# Patient Record
Sex: FEMALE | Race: WHITE | NOT HISPANIC OR LATINO | URBAN - METROPOLITAN AREA
[De-identification: names, ages, dates, MRNs, and addresses within clinical notes are randomized per-mention and may not be internally consistent; named-entity substitution may affect disease eponyms.]

---

## 2022-05-03 ENCOUNTER — LAB (OUTPATIENT)
Dept: LAB | Facility: HOSPITAL | Age: 37
End: 2022-05-03

## 2022-05-03 ENCOUNTER — OFFICE VISIT (OUTPATIENT)
Dept: INTERNAL MEDICINE | Facility: CLINIC | Age: 37
End: 2022-05-03

## 2022-05-03 VITALS
OXYGEN SATURATION: 98 % | WEIGHT: 253 LBS | TEMPERATURE: 98.4 F | DIASTOLIC BLOOD PRESSURE: 78 MMHG | BODY MASS INDEX: 44.83 KG/M2 | HEIGHT: 63 IN | HEART RATE: 68 BPM | SYSTOLIC BLOOD PRESSURE: 126 MMHG | RESPIRATION RATE: 16 BRPM

## 2022-05-03 DIAGNOSIS — E66.01 CLASS 3 SEVERE OBESITY WITH SERIOUS COMORBIDITY AND BODY MASS INDEX (BMI) OF 40.0 TO 44.9 IN ADULT, UNSPECIFIED OBESITY TYPE: ICD-10-CM

## 2022-05-03 DIAGNOSIS — Z13.220 LIPID SCREENING: ICD-10-CM

## 2022-05-03 DIAGNOSIS — G56.91 NEUROPATHY OF HAND, RIGHT: ICD-10-CM

## 2022-05-03 DIAGNOSIS — Z13.29 THYROID DISORDER SCREENING: ICD-10-CM

## 2022-05-03 DIAGNOSIS — Z13.0 SCREENING FOR BLOOD DISEASE: ICD-10-CM

## 2022-05-03 DIAGNOSIS — Z13.21 ENCOUNTER FOR VITAMIN DEFICIENCY SCREENING: ICD-10-CM

## 2022-05-03 DIAGNOSIS — Z13.1 SCREENING FOR DIABETES MELLITUS: ICD-10-CM

## 2022-05-03 DIAGNOSIS — M54.2 CERVICAL PAIN: Primary | ICD-10-CM

## 2022-05-03 DIAGNOSIS — J30.89 ENVIRONMENTAL AND SEASONAL ALLERGIES: ICD-10-CM

## 2022-05-03 DIAGNOSIS — Z11.59 ENCOUNTER FOR HEPATITIS C SCREENING TEST FOR LOW RISK PATIENT: ICD-10-CM

## 2022-05-03 PROBLEM — G89.29 CHRONIC MIDLINE LOW BACK PAIN WITHOUT SCIATICA: Status: ACTIVE | Noted: 2019-01-27

## 2022-05-03 PROBLEM — H69.83 DYSFUNCTION OF BOTH EUSTACHIAN TUBES: Status: ACTIVE | Noted: 2019-01-27

## 2022-05-03 PROBLEM — G43.019 COMMON MIGRAINE WITH INTRACTABLE MIGRAINE: Status: ACTIVE | Noted: 2022-05-03

## 2022-05-03 PROBLEM — M54.50 CHRONIC MIDLINE LOW BACK PAIN WITHOUT SCIATICA: Status: ACTIVE | Noted: 2019-01-27

## 2022-05-03 PROBLEM — E66.813 CLASS 3 SEVERE OBESITY WITH BODY MASS INDEX (BMI) OF 40.0 TO 44.9 IN ADULT: Status: ACTIVE | Noted: 2019-01-13

## 2022-05-03 PROBLEM — F32.9 REACTIVE DEPRESSION: Status: ACTIVE | Noted: 2019-01-27

## 2022-05-03 PROBLEM — R20.0 LOSS OF FEELING OR SENSATION: Status: ACTIVE | Noted: 2022-05-03

## 2022-05-03 PROBLEM — F41.9 ANXIETY: Status: ACTIVE | Noted: 2019-01-27

## 2022-05-03 PROBLEM — Z90.49 HX OF CHOLECYSTECTOMY: Status: ACTIVE | Noted: 2018-12-17

## 2022-05-03 PROBLEM — H69.93 DYSFUNCTION OF BOTH EUSTACHIAN TUBES: Status: ACTIVE | Noted: 2019-01-27

## 2022-05-03 LAB
25(OH)D3 SERPL-MCNC: 13.8 NG/ML (ref 30–100)
ALBUMIN SERPL-MCNC: 4.2 G/DL (ref 3.5–5.2)
ALBUMIN/GLOB SERPL: 1.2 G/DL
ALP SERPL-CCNC: 71 U/L (ref 39–117)
ALT SERPL W P-5'-P-CCNC: 41 U/L (ref 1–33)
ANION GAP SERPL CALCULATED.3IONS-SCNC: 8.3 MMOL/L (ref 5–15)
AST SERPL-CCNC: 26 U/L (ref 1–32)
BILIRUB SERPL-MCNC: 0.3 MG/DL (ref 0–1.2)
BUN SERPL-MCNC: 13 MG/DL (ref 6–20)
BUN/CREAT SERPL: 16 (ref 7–25)
CALCIUM SPEC-SCNC: 9.5 MG/DL (ref 8.6–10.5)
CHLORIDE SERPL-SCNC: 107 MMOL/L (ref 98–107)
CHOLEST SERPL-MCNC: 198 MG/DL (ref 0–200)
CO2 SERPL-SCNC: 23.7 MMOL/L (ref 22–29)
CREAT SERPL-MCNC: 0.81 MG/DL (ref 0.57–1)
DEPRECATED RDW RBC AUTO: 42.5 FL (ref 37–54)
EGFRCR SERPLBLD CKD-EPI 2021: 97.8 ML/MIN/1.73
ERYTHROCYTE [DISTWIDTH] IN BLOOD BY AUTOMATED COUNT: 12.4 % (ref 12.3–15.4)
FOLATE SERPL-MCNC: 8.45 NG/ML (ref 4.78–24.2)
GLOBULIN UR ELPH-MCNC: 3.6 GM/DL
GLUCOSE SERPL-MCNC: 77 MG/DL (ref 65–99)
HBA1C MFR BLD: 5.1 % (ref 4.8–5.6)
HCT VFR BLD AUTO: 43.3 % (ref 34–46.6)
HCV AB SER DONR QL: NORMAL
HDLC SERPL-MCNC: 44 MG/DL (ref 40–60)
HGB BLD-MCNC: 14.3 G/DL (ref 12–15.9)
LDLC SERPL CALC-MCNC: 123 MG/DL (ref 0–100)
LDLC/HDLC SERPL: 2.7 {RATIO}
MCH RBC QN AUTO: 30.6 PG (ref 26.6–33)
MCHC RBC AUTO-ENTMCNC: 33 G/DL (ref 31.5–35.7)
MCV RBC AUTO: 92.7 FL (ref 79–97)
PLATELET # BLD AUTO: 295 10*3/MM3 (ref 140–450)
PMV BLD AUTO: 10.4 FL (ref 6–12)
POTASSIUM SERPL-SCNC: 4.7 MMOL/L (ref 3.5–5.2)
PROT SERPL-MCNC: 7.8 G/DL (ref 6–8.5)
RBC # BLD AUTO: 4.67 10*6/MM3 (ref 3.77–5.28)
SODIUM SERPL-SCNC: 139 MMOL/L (ref 136–145)
TRIGL SERPL-MCNC: 176 MG/DL (ref 0–150)
TSH SERPL DL<=0.05 MIU/L-ACNC: 1.69 UIU/ML (ref 0.27–4.2)
VIT B12 BLD-MCNC: 482 PG/ML (ref 211–946)
VLDLC SERPL-MCNC: 31 MG/DL (ref 5–40)
WBC NRBC COR # BLD: 8.63 10*3/MM3 (ref 3.4–10.8)

## 2022-05-03 PROCEDURE — 84443 ASSAY THYROID STIM HORMONE: CPT | Performed by: NURSE PRACTITIONER

## 2022-05-03 PROCEDURE — 99204 OFFICE O/P NEW MOD 45 MIN: CPT | Performed by: NURSE PRACTITIONER

## 2022-05-03 PROCEDURE — 83036 HEMOGLOBIN GLYCOSYLATED A1C: CPT | Performed by: NURSE PRACTITIONER

## 2022-05-03 PROCEDURE — 82306 VITAMIN D 25 HYDROXY: CPT | Performed by: NURSE PRACTITIONER

## 2022-05-03 PROCEDURE — 80061 LIPID PANEL: CPT | Performed by: NURSE PRACTITIONER

## 2022-05-03 PROCEDURE — 85027 COMPLETE CBC AUTOMATED: CPT | Performed by: NURSE PRACTITIONER

## 2022-05-03 PROCEDURE — 82746 ASSAY OF FOLIC ACID SERUM: CPT | Performed by: NURSE PRACTITIONER

## 2022-05-03 PROCEDURE — 82607 VITAMIN B-12: CPT | Performed by: NURSE PRACTITIONER

## 2022-05-03 PROCEDURE — 80053 COMPREHEN METABOLIC PANEL: CPT | Performed by: NURSE PRACTITIONER

## 2022-05-03 PROCEDURE — 86803 HEPATITIS C AB TEST: CPT | Performed by: NURSE PRACTITIONER

## 2022-05-03 RX ORDER — PHENTERMINE HYDROCHLORIDE 37.5 MG/1
37.5 TABLET ORAL
Qty: 30 TABLET | Refills: 0 | Status: SHIPPED | OUTPATIENT
Start: 2022-05-03 | End: 2022-07-14 | Stop reason: SDUPTHER

## 2022-05-03 RX ORDER — FLUTICASONE PROPIONATE 50 MCG
2 SPRAY, SUSPENSION (ML) NASAL DAILY
Qty: 15.8 ML | Refills: 5 | Status: SHIPPED | OUTPATIENT
Start: 2022-05-03 | End: 2022-07-14 | Stop reason: SDUPTHER

## 2022-05-03 RX ORDER — METHOCARBAMOL 750 MG/1
TABLET, FILM COATED ORAL
Qty: 60 TABLET | Refills: 5 | Status: SHIPPED | OUTPATIENT
Start: 2022-05-03 | End: 2022-07-14

## 2022-05-03 RX ORDER — MELOXICAM 15 MG/1
TABLET ORAL
Qty: 30 TABLET | Refills: 5 | Status: SHIPPED | OUTPATIENT
Start: 2022-05-03 | End: 2022-07-14

## 2022-05-03 RX ORDER — LORATADINE 10 MG/1
10 TABLET ORAL DAILY
Qty: 30 TABLET | Refills: 5 | Status: SHIPPED | OUTPATIENT
Start: 2022-05-03 | End: 2022-07-14 | Stop reason: SDUPTHER

## 2022-05-03 NOTE — PROGRESS NOTES
"Subjective   Chief Complaint   Patient presents with   • Establish Care      Kenya Novoa is a 36 y.o. female here today to establish care for allergies, obesity, neck pain, and hand problem. This provider saw patient many years ago and familiar with her. Seasonal allergies have worsened. Has bilateral ear fullness with nasal congestion and post nasal drip. Not taking any anti-histamines or nasal sprays. Has chronic cervical pain. Pain worsens with rotation of head to the right and pain radiates down her spine. No pain when rotate head to left. When she wakes up in the morning has difficult time moving neck. No imaging in years. Having right hand pain with burning sensation and feels like it's \"falling asleep\". This is chronic issue and told she has carpal tunnel but no prior EMG. Would like work up to see ortho for intervention. Has recent weight gain and struggling with appetite and difficulty losing weight. Trying to follow a heart healthy low cholesterol diet and be physically active. Has tried adipex in the past that greatly helped boost her diet with positive weight loss. No shortness of breath, palpitations, or chest pain.         I have reviewed the following portions of the patient's history and confirmed they are accurate: allergies, current medications, past family history, past medical history, past social history, past surgical history and problem list    I have personally completed the patient's review of systems.    Review of Systems   Constitutional: Negative for activity change, appetite change, chills, diaphoresis, fatigue, fever, unexpected weight gain and unexpected weight loss.   HENT: Positive for congestion, postnasal drip and rhinorrhea. Negative for ear discharge, ear pain, mouth sores, nosebleeds, sinus pressure, sneezing and sore throat.    Eyes: Negative for pain, discharge and itching.   Respiratory: Negative for cough, chest tightness, shortness of breath and wheezing.  " "  Cardiovascular: Negative for chest pain, palpitations and leg swelling.   Gastrointestinal: Negative for abdominal pain, constipation, diarrhea, nausea and vomiting.   Endocrine: Negative for heat intolerance, polydipsia and polyphagia.   Genitourinary: Negative for dysuria, flank pain, frequency, hematuria and urgency.   Musculoskeletal: Positive for arthralgias, back pain, myalgias, neck pain and neck stiffness. Negative for gait problem and joint swelling.   Skin: Negative for color change, pallor and rash.   Allergic/Immunologic: Positive for environmental allergies. Negative for immunocompromised state.   Neurological: Positive for numbness. Negative for seizures, speech difficulty and weakness.   Hematological: Negative for adenopathy.   Psychiatric/Behavioral: Negative for agitation, decreased concentration, sleep disturbance, suicidal ideas and depressed mood. The patient is not nervous/anxious.        Current Outpatient Medications on File Prior to Visit   Medication Sig   • [DISCONTINUED] HYDROcodone-acetaminophen (NORCO) 7.5-325 MG per tablet Take 1 tablet by mouth every 8 (eight) hours as needed for moderate pain (4-6) (Pain).   • [DISCONTINUED] ibuprofen (ADVIL,MOTRIN) 200 MG tablet Take 200 mg by mouth every 6 (six) hours as needed for mild pain (1-3).   • [DISCONTINUED] traMADol (ULTRAM) 50 MG tablet Take 1 tablet by mouth 2 (two) times a day. (Patient taking differently: Take 50 mg by mouth 2 (Two) Times a Day As Needed for Moderate Pain .)     No current facility-administered medications on file prior to visit.       Objective   Vitals:    05/03/22 0833   BP: 126/78   Pulse: 68   Resp: 16   Temp: 98.4 °F (36.9 °C)   TempSrc: Temporal   SpO2: 98%   Weight: 115 kg (253 lb)   Height: 160 cm (63\")     Body mass index is 44.82 kg/m².    Physical Exam  Vitals reviewed.   Constitutional:       Appearance: Normal appearance. She is well-developed.   HENT:      Head: Normocephalic and atraumatic.      " Nose: Nose normal.   Eyes:      General: Lids are normal.      Conjunctiva/sclera: Conjunctivae normal.      Pupils: Pupils are equal, round, and reactive to light.   Neck:      Thyroid: No thyromegaly.      Trachea: Trachea normal.   Cardiovascular:      Rate and Rhythm: Normal rate and regular rhythm.      Heart sounds: Normal heart sounds.   Pulmonary:      Effort: Pulmonary effort is normal. No respiratory distress.      Breath sounds: Normal breath sounds.   Musculoskeletal:      Right wrist: Tenderness present.      Cervical back: Pain with movement and muscular tenderness present.      Comments: Positive Tinel 's sign   Skin:     General: Skin is warm and dry.   Neurological:      Mental Status: She is alert and oriented to person, place, and time.      GCS: GCS eye subscore is 4. GCS verbal subscore is 5. GCS motor subscore is 6.   Psychiatric:         Attention and Perception: Attention normal.         Mood and Affect: Mood normal.         Speech: Speech normal.         Behavior: Behavior normal. Behavior is cooperative.         Thought Content: Thought content normal.         Assessment/Plan   Problem List Items Addressed This Visit        Allergies and Adverse Reactions    Environmental and seasonal allergies  Chronic unstable. Start flonase and claritin.       Relevant Medications    fluticasone (Flonase) 50 MCG/ACT nasal spray    loratadine (CLARITIN) 10 MG tablet       Endocrine and Metabolic    Class 3 severe obesity with body mass index (BMI) of 40.0 to 44.9 in adult (HCC)  Chronic unstable. Follow a low carb/sugar and low fat diet. Will increase physical activity. Will drink adequate water. Patient was educated on side effects of taking Adipex medications including risk of addiction, increased heart rate, blood pressure, anxiety, insomnia, and dry mouth. Patient verbalized understanding and wants to continue with this medication. Patient will stop medication and schedule earlier follow up if these  symptoms occur. Patient will increase water intake, follow a well balanced diet low in cholesterol/carbs/sugars, decrease portion sizes, and increase physical activity.   Start adipex.       Relevant Medications    phentermine (ADIPEX-P) 37.5 MG tablet       Musculoskeletal and Injuries    Cervical pain - Primary  Chronic unstable. Start meloxicam and robaxin. Will get updated xray if meds not helping.       Relevant Medications    meloxicam (MOBIC) 15 MG tablet    methocarbamol (ROBAXIN) 750 MG tablet       Neuro    Neuropathy of hand, right  Chronic unstable. EMG ordered.       Relevant Orders    EMG & Nerve Conduction Test      Other Visit Diagnoses     Screening for blood disease        Relevant Orders    CBC (No Diff)    Comprehensive Metabolic Panel    Lipid Panel    Hemoglobin A1c    TSH Rfx On Abnormal To Free T4    Vitamin B12 & Folate    Vitamin D 25 Hydroxy    Hepatitis C Antibody    Thyroid disorder screening        Relevant Orders    TSH Rfx On Abnormal To Free T4    Lipid screening        Relevant Orders    Lipid Panel    Encounter for vitamin deficiency screening        Relevant Orders    Vitamin B12 & Folate    Vitamin D 25 Hydroxy    Screening for diabetes mellitus        Relevant Orders    Hemoglobin A1c    Encounter for hepatitis C screening test for low risk patient        Relevant Orders    Hepatitis C Antibody             Current Outpatient Medications:   •  fluticasone (Flonase) 50 MCG/ACT nasal spray, 2 sprays into the nostril(s) as directed by provider Daily., Disp: 15.8 mL, Rfl: 5  •  loratadine (CLARITIN) 10 MG tablet, Take 1 tablet by mouth Daily., Disp: 30 tablet, Rfl: 5  •  meloxicam (MOBIC) 15 MG tablet, Take 1/2 to 1 tablet by mouth once daily as needed for moderate pain. Take with food, Disp: 30 tablet, Rfl: 5  •  methocarbamol (ROBAXIN) 750 MG tablet, Take 1/2-1 tablet by mouth 4 times daily as needed for muscle spasms., Disp: 60 tablet, Rfl: 5  •  phentermine (ADIPEX-P) 37.5 MG  tablet, Take 1 tablet by mouth Every Morning Before Breakfast., Disp: 30 tablet, Rfl: 0       Plan of care reviewed with the patient at the conclusion of today's visit.  Education was provided regarding diagnosis, management, and any prescribed or recommended OTC medications.  Patient verbalized understanding of and agreement with management plan.     Return in about 1 month (around 6/3/2022), or if symptoms worsen or fail to improve.      Melissa Whaley, APRN

## 2022-05-04 ENCOUNTER — PATIENT ROUNDING (BHMG ONLY) (OUTPATIENT)
Dept: INTERNAL MEDICINE | Facility: CLINIC | Age: 37
End: 2022-05-04

## 2022-05-04 NOTE — PROGRESS NOTES
A  my chart message has been sent to the patient for [place] [facility] [place]

## 2022-07-03 RX ORDER — DIPHENOXYLATE HYDROCHLORIDE AND ATROPINE SULFATE 2.5; .025 MG/1; MG/1
1 TABLET ORAL DAILY
Qty: 30 TABLET | Refills: 11 | Status: SHIPPED | OUTPATIENT
Start: 2022-07-03

## 2022-07-03 RX ORDER — ERGOCALCIFEROL 1.25 MG/1
50000 CAPSULE ORAL WEEKLY
Qty: 4 CAPSULE | Refills: 5 | Status: SHIPPED | OUTPATIENT
Start: 2022-07-03 | End: 2023-03-06 | Stop reason: SDUPTHER

## 2022-07-14 ENCOUNTER — OFFICE VISIT (OUTPATIENT)
Dept: INTERNAL MEDICINE | Facility: CLINIC | Age: 37
End: 2022-07-14

## 2022-07-14 VITALS
SYSTOLIC BLOOD PRESSURE: 126 MMHG | WEIGHT: 256 LBS | BODY MASS INDEX: 45.36 KG/M2 | DIASTOLIC BLOOD PRESSURE: 80 MMHG | HEART RATE: 78 BPM | HEIGHT: 63 IN | OXYGEN SATURATION: 99 %

## 2022-07-14 DIAGNOSIS — M54.50 LUMBAR PAIN: ICD-10-CM

## 2022-07-14 DIAGNOSIS — J30.89 ENVIRONMENTAL AND SEASONAL ALLERGIES: ICD-10-CM

## 2022-07-14 DIAGNOSIS — M54.2 NECK PAIN: ICD-10-CM

## 2022-07-14 DIAGNOSIS — G89.29 CHRONIC RIGHT SHOULDER PAIN: ICD-10-CM

## 2022-07-14 DIAGNOSIS — M25.511 CHRONIC RIGHT SHOULDER PAIN: ICD-10-CM

## 2022-07-14 DIAGNOSIS — E66.01 CLASS 3 SEVERE OBESITY WITH SERIOUS COMORBIDITY AND BODY MASS INDEX (BMI) OF 40.0 TO 44.9 IN ADULT, UNSPECIFIED OBESITY TYPE: Primary | ICD-10-CM

## 2022-07-14 PROCEDURE — 99214 OFFICE O/P EST MOD 30 MIN: CPT | Performed by: NURSE PRACTITIONER

## 2022-07-14 RX ORDER — FLUTICASONE PROPIONATE 50 MCG
2 SPRAY, SUSPENSION (ML) NASAL DAILY
Qty: 15.8 ML | Refills: 5 | Status: SHIPPED | OUTPATIENT
Start: 2022-07-14 | End: 2023-03-06 | Stop reason: SDUPTHER

## 2022-07-14 RX ORDER — PHENTERMINE HYDROCHLORIDE 37.5 MG/1
37.5 TABLET ORAL
Qty: 30 TABLET | Refills: 1 | Status: SHIPPED | OUTPATIENT
Start: 2022-07-14 | End: 2023-03-06 | Stop reason: SDUPTHER

## 2022-07-14 RX ORDER — LORATADINE 10 MG/1
10 TABLET ORAL DAILY
Qty: 30 TABLET | Refills: 5 | Status: SHIPPED | OUTPATIENT
Start: 2022-07-14 | End: 2023-03-06 | Stop reason: SDUPTHER

## 2022-07-14 RX ORDER — TOPIRAMATE 25 MG/1
25 TABLET ORAL 2 TIMES DAILY
Qty: 60 TABLET | Refills: 2 | Status: SHIPPED | OUTPATIENT
Start: 2022-07-14 | End: 2023-03-06 | Stop reason: SDUPTHER

## 2022-07-14 RX ORDER — DICLOFENAC SODIUM 75 MG/1
75 TABLET, DELAYED RELEASE ORAL 2 TIMES DAILY PRN
Qty: 60 TABLET | Refills: 2 | Status: SHIPPED | OUTPATIENT
Start: 2022-07-14 | End: 2023-03-06

## 2022-07-14 RX ORDER — CYCLOBENZAPRINE HCL 10 MG
10 TABLET ORAL 3 TIMES DAILY PRN
Qty: 90 TABLET | Refills: 2 | Status: SHIPPED | OUTPATIENT
Start: 2022-07-14 | End: 2023-03-06

## 2022-07-14 NOTE — PROGRESS NOTES
Subjective   Chief Complaint   Patient presents with   • Follow-up      Kenya Novoa is a 36 y.o. female.     The patient is here today for a follow up on obesity and neck pain.    Obesity  The patient reports that she is doing good. She states that she is currently on Adipex and doing well on it. She reports that she is just having trouble refraining from soda. She does state that she has tried Topamax in the past and that it made the soda taste disgusting and helped alleviate her cravings. She states that she would like to take Topamax again to help with this again. The patient reports that she did well on it in the past, she denies having headaches while taking it, and reports that her blood pressure was normal also. The patient denies increase in heart palpitations and anxiety symptoms while on it also.     Environmental seasonal Allergies  The patient reports that she is doing well on Claritin and Flonase and would like to have them refilled today.    Neck pain  The patient reports that when she had a cholecystectomy a few years ago, she was told that she could have recurring, shooting pain in her neck ,shoulder and lower middle back. She reports an instance when she was cleaning out her cabinets, and shortly after she went to bed and reports that she could not get out of bed due to the pain in her lower middle back. She also reports having to brace herself when sitting and standing due to the pain in her right shoulder, back and neck. She states that this has been ongoing since the surgery. The patient states that the meloxicam and Robaxin that were prescribed, do not provide any relief. She reports that she did have an x-ray of the shoulder and neck a few years ago and they were reported normal. She denies having an x-ray on her lower back in the past.  The patients states that she has never been advised to do physical therapy. The patient does recall trying either Flexeril or Zanaflex in the past,  not exactly sure which one, but she states that they did not give her any relief and she discontinued them after about a month, and was prescribed meloxicam. The patient states that she is unsure if she has ever tried diclofenac pills in the past for this current pain.  She reports that she has tried Biofreeze in the past and she did not get much relief from that either, and she discontinued using it. She states that she has never tried Voltaren gel. The patient reports that her mom was diagnosed with deteriorating bone disease, and this has become a concern with her due to the pain and discomfort she has been experiencing. She denies having pain all over, just specifically in her shoulder, neck and mid lower back currently.    I have reviewed the following portions of the patient's history and confirmed they are accurate: allergies, current medications, past family history, past medical history, past social history, past surgical history, and problem list    I have personally completed the patient's review of systems.    Review of Systems   Constitutional: Negative for activity change, appetite change, chills, diaphoresis, fatigue, fever, unexpected weight gain and unexpected weight loss.   HENT: Negative for congestion, ear discharge, ear pain, mouth sores, nosebleeds, postnasal drip, rhinorrhea, sinus pressure, sneezing and sore throat.    Eyes: Negative for pain, discharge and itching.   Respiratory: Negative for cough, chest tightness, shortness of breath and wheezing.    Cardiovascular: Negative for chest pain, palpitations and leg swelling.   Gastrointestinal: Negative for abdominal pain, constipation, diarrhea, nausea and vomiting.   Endocrine: Negative for heat intolerance, polydipsia and polyphagia.   Genitourinary: Negative for dysuria, flank pain, frequency, hematuria and urgency.   Musculoskeletal: Positive for arthralgias, back pain, myalgias, neck pain and neck stiffness. Negative for gait problem and  "joint swelling.   Skin: Negative for color change, pallor and rash.   Allergic/Immunologic: Positive for environmental allergies. Negative for immunocompromised state.   Neurological: Positive for numbness. Negative for seizures, speech difficulty and weakness.   Hematological: Negative for adenopathy.   Psychiatric/Behavioral: Negative for agitation, decreased concentration, sleep disturbance, suicidal ideas and depressed mood. The patient is not nervous/anxious.        Current Outpatient Medications on File Prior to Visit   Medication Sig   • multivitamin (Multiple Vitamin) tablet tablet Take 1 tablet by mouth Daily.   • vitamin D (ERGOCALCIFEROL) 1.25 MG (10581 UT) capsule capsule Take 1 capsule by mouth 1 (One) Time Per Week.     No current facility-administered medications on file prior to visit.       Objective   Vitals:    07/14/22 1345   BP: 126/80   Pulse: 78   SpO2: 99%   Weight: 116 kg (256 lb)   Height: 160 cm (63\")     Body mass index is 45.35 kg/m².    Physical Exam  Vitals reviewed.   Constitutional:       Appearance: Normal appearance. She is well-developed.   HENT:      Head: Normocephalic and atraumatic.      Nose: Nose normal.   Eyes:      General: Lids are normal.      Conjunctiva/sclera: Conjunctivae normal.      Pupils: Pupils are equal, round, and reactive to light.   Neck:      Thyroid: No thyromegaly.      Trachea: Trachea normal.   Cardiovascular:      Rate and Rhythm: Normal rate and regular rhythm.      Heart sounds: Normal heart sounds.   Pulmonary:      Effort: Pulmonary effort is normal. No respiratory distress.      Breath sounds: Normal breath sounds.   Musculoskeletal:      Right shoulder: Tenderness present.      Cervical back: Pain with movement and muscular tenderness present.      Lumbar back: Spasms and tenderness present.   Skin:     General: Skin is warm and dry.   Neurological:      Mental Status: She is alert and oriented to person, place, and time.      GCS: GCS eye " subscore is 4. GCS verbal subscore is 5. GCS motor subscore is 6.   Psychiatric:         Attention and Perception: Attention normal.         Mood and Affect: Mood normal.         Speech: Speech normal.         Behavior: Behavior normal. Behavior is cooperative.         Thought Content: Thought content normal.         Assessment & Plan   Problem List Items Addressed This Visit        Allergies and Adverse Reactions    Environmental and seasonal allergies    Relevant Medications    loratadine (CLARITIN) 10 MG tablet    fluticasone (Flonase) 50 MCG/ACT nasal spray       Endocrine and Metabolic    Class 3 severe obesity with body mass index (BMI) of 40.0 to 44.9 in adult (HCC) - Primary    Relevant Medications    topiramate (Topamax) 25 MG tablet    phentermine (ADIPEX-P) 37.5 MG tablet      Other Visit Diagnoses     Lumbar pain        Relevant Medications    cyclobenzaprine (FLEXERIL) 10 MG tablet    diclofenac (VOLTAREN) 75 MG EC tablet    Other Relevant Orders    XR Spine Lumbar Complete 4+VW    Neck pain        Relevant Medications    cyclobenzaprine (FLEXERIL) 10 MG tablet    diclofenac (VOLTAREN) 75 MG EC tablet    Other Relevant Orders    XR Spine Cervical Complete 4 or 5 View    Chronic right shoulder pain        Relevant Medications    cyclobenzaprine (FLEXERIL) 10 MG tablet    diclofenac (VOLTAREN) 75 MG EC tablet    Other Relevant Orders    XR Shoulder 2+ View Right       Obesity  Chronic, unstable. Restart Adipex. Start Topamax. Patient was educated on side effects of taking Adipex medications including risk of addiction, increased heart rate, blood pressure, anxiety, insomnia, and dry mouth. Patient verbalized understanding and wants to continue with this medication. Patient will stop medication and schedule earlier follow up if these symptoms occur. Patient will increase water intake, follow a well balanced diet low in cholesterol/carbs/sugars, decrease portion sizes, and increase physical activity.      Environmental seasonal allergies  Chronic, stable. Continue Claritin and Flonase.    Lumbar pain  Chronic, unstable. Start diclofenac and Flexeril. X-ray ordered. Consider physical therapy referral and MRI.    Neck pain  Chronic, unstable. Start diclofenac and Flexeril. X-ray ordered. Consider physical therapy referral and MRI.    Chronic right shoulder pain  Chronic, unstable. Start diclofenac and Robaxin. Consider physical therapy referral. Consider orthopedics referral. X-ray ordered.      Current Outpatient Medications:   •  fluticasone (Flonase) 50 MCG/ACT nasal spray, 2 sprays into the nostril(s) as directed by provider Daily., Disp: 15.8 mL, Rfl: 5  •  loratadine (CLARITIN) 10 MG tablet, Take 1 tablet by mouth Daily., Disp: 30 tablet, Rfl: 5  •  multivitamin (Multiple Vitamin) tablet tablet, Take 1 tablet by mouth Daily., Disp: 30 tablet, Rfl: 11  •  phentermine (ADIPEX-P) 37.5 MG tablet, Take 1 tablet by mouth Every Morning Before Breakfast., Disp: 30 tablet, Rfl: 1  •  vitamin D (ERGOCALCIFEROL) 1.25 MG (21705 UT) capsule capsule, Take 1 capsule by mouth 1 (One) Time Per Week., Disp: 4 capsule, Rfl: 5  •  cyclobenzaprine (FLEXERIL) 10 MG tablet, Take 1 tablet by mouth 3 (Three) Times a Day As Needed for Muscle Spasms., Disp: 90 tablet, Rfl: 2  •  diclofenac (VOLTAREN) 75 MG EC tablet, Take 1 tablet by mouth 2 (Two) Times a Day As Needed (moderate pain). Take with food., Disp: 60 tablet, Rfl: 2  •  topiramate (Topamax) 25 MG tablet, Take 1 tablet by mouth 2 (Two) Times a Day., Disp: 60 tablet, Rfl: 2       Plan of care reviewed with the patient at the conclusion of today's visit.  Education was provided regarding diagnosis, management, and any prescribed or recommended OTC medications.  Patient verbalized understanding of and agreement with management plan.     Return in about 2 months (around 9/14/2022), or if symptoms worsen or fail to improve.      Transcribed from ambient dictation for Melissa  RAUDEL Cárdenas by Samantha Paredes.   07/14/22   14:12 EDT    Patient verbalized consent to the visit recording.

## 2022-08-31 ENCOUNTER — TELEPHONE (OUTPATIENT)
Dept: INTERNAL MEDICINE | Facility: CLINIC | Age: 37
End: 2022-08-31

## 2022-08-31 ENCOUNTER — E-VISIT (OUTPATIENT)
Dept: FAMILY MEDICINE CLINIC | Facility: TELEHEALTH | Age: 37
End: 2022-08-31

## 2022-08-31 PROCEDURE — BRIGHTMDVISIT: Performed by: NURSE PRACTITIONER

## 2022-08-31 NOTE — EXTERNAL PATIENT INSTRUCTIONS
Diagnosis   Eustachian tube dysfunction   My name is April Harman. I'm a healthcare provider at [facility].   I've reviewed your interview, and I believe your symptoms are caused by eustachian tube dysfunction (ETD), not a bacterial infection. ETD usually isn't serious, and it generally gets better on its own with time.   Medications   Your pharmacy   [facility] [address] [facility] [id number] [phone]     Prescription   Ibuprofen (800mg): Take 1 tablet by mouth every 8 hours for up to 10 days as needed for any fever, pain, or discomfort associated with your condition. Take with food to avoid upset stomach. Do not exceed 3200 mg (4 tablets) in a 24-hour period.    I've given you a prescription dose of ibuprofen. If it's more affordable or convenient, you may use the equivalent amount of non-prescription ibuprofen. For example, instead of taking one 800 mg ibuprofen tablet, you may take four 200 mg ibuprofen tablets. Don't take ibuprofen with other non-steroidal anti-inflammatory drugs (NSAIDs), including naproxen or aspirin. Taking these medications together can increase the risk of serious side effects.   Non-prescription   Fluticasone (50mcg): Spray 2 sprays in each nostril daily for 14 days for ear discomfort or allergies. Fluticasone needs to be used every day to work. It may take up to a week for the full effects of the medication to be seen.    You mentioned that you've recently had cold or allergy symptoms. I've recommended medications to help with these symptoms.   About your diagnosis   The middle ear is connected to the nose and throat by the eustachian tube. The eustachian tube keeps the air pressure inside the middle ear equal to the air pressure outside the body. Sometimes the eustachian tube gets swollen or blocked, such as during a cold, allergies, or sinus infection. When this happens, the eustachian tube can't keep the pressure equal. This difference in pressure can  cause a blocked or plugged feeling in the ear.   Key things to know about ETD:    You mentioned that you've recently had nasal congestion or other cold-like symptoms. Nasal congestion is a very common cause of eustachian tube dysfunction.    Acid reflux is a condition that causes heartburn. It happens when stomach acid moves up the esophagus and even into the throat. Your ETD could be related to your symptoms of acid reflux.   What to expect   If you follow the advice in this treatment plan, you should feel better within 1 to 2 days.   When to seek care   Call us at [phone]   with any sudden or unexpected symptoms.    Pain that doesn't start to improve within a few days.    Hearing loss that worsens or doesn't improve.    New or bloody drainage from your ear.    Pain, tenderness, redness, or swelling on the bony part behind your ear along with a fever.    A fever that's over 103F or continues for more than 4 days.    Sudden difficulty standing up or walking.   Other treatment   Some simple self-care steps can open your eustachian tube(s) and make your ear pressure equal again. Try chewing gum, sucking on candy, exhaling with your mouth closed and your nose pinched shut, or yawning.   Smoke or air pollution can cause eustachian tube dysfunction or make it worse. Try to avoid exposure to tobacco smoke, smoke from a fire or stove, and air pollution while you heal.   Prevention   Avoid air travel when you have symptoms of a cold, sinus infection, or allergies. If you must travel, take a non-prescription nasal decongestant or antihistamine before takeoff. This can also be helpful for other altitude changes, such as hiking or driving in the mountains. Don't take decongestants before scuba diving.   Your provider   Your diagnosis was provided by April Harman, a member of your trusted care team at [facility].   If you have any questions, call us at [phone]  .

## 2022-08-31 NOTE — TELEPHONE ENCOUNTER
Pt called the office and stated she believes she Is getting a ear infection, she also has a sore throat. Pt already went to  but all they prescribed her was ibprofune. I made pt an a video visit for tomorrow morning, but she wanted to see if something could just be called into her pharmacy, please advise.

## 2022-08-31 NOTE — E-VISIT TREATED
Chief Complaint: Ear pain   Patient introduction   Patient is 37-year-old female who has an ache, pruritus, and crackling/popping in both ears.   Patient did not request an excuse note.   General presentation   Patient first noticed symptoms 2 to 3 days ago. They came on gradually. Symptoms are always there. Patient rates their pain as moderate (4 to 6/10).   Patient does not have a fever.   Patient has symptoms of tinnitus. No hearing loss. No drainage.   No history of PE tubes.   Ear is not red, swollen, warm to the touch, or painful to the touch.   Patient also has:    Respiratory symptoms, including stuffy nose, sinus pain or pressure, cough, and scratchy or sore throat. Has had nasal/sinus symptoms for 3 to 5 days. Patient's nasal/sinus symptoms have not improved.    Acid reflux symptoms, including difficulty swallowing but no history of GERD.    Headache described as mild/moderate.    Mild dizziness that does not affect patient's ability to stand or walk.   No recent airplane travel, scuba diving, hiking or driving in the mountains, or exposure to a loud blast or explosion. No swimming or water in ears in the last few weeks. No recent exposure to tobacco smoke, smoke from a fire or wood-burning stove, or air pollution. No regular use of hearing aids, earbuds or in-ear headphones, swim caps, cotton swabs, or ear canal irrigation kits.   Last antibiotic treatment for an ear infection was more than 1 year ago.   Review of alarm symptoms/red flags:    No current treatment by ENT    No current PE tubes in affected ear(s)    No mastoid or ear surgery in the last 5 years    No mastoid redness, warmth, or pain    No sharp or pointed object in ear canal    No foreign body in ear    No recent head trauma    No vision changes    No symptoms of periauricular cellulitis or perichondritis    No severe dizziness or vertigo    No hearing loss in both ears    No recent jaw trauma    No recent jaw dislocation    No recent  dental work    No recent dental infection or abscess   Pregnancy/menstrual status/breastfeeding:   Not pregnant. Not breastfeeding. Regarding last menstrual period, patient writes: Partial hysterectomy 2014.   Self-exam:    No difficulty moving their chin toward their chest    No mastoid redness, warmth, or pain    Pain is exacerbated by chewing or moving the jaw    Pain is exacerbated by manipulation of the pinna and/or pressure on the tragus    Pain is unchanged when lying down   Current medications   Patient has used the following OTC medication(s) for their ear symptoms: acetaminophen and cetirizine.   Not taking other medications or supplements.   Medication allergies   None.   Medication contraindication review   No history of arrhythmia, congestive heart failure, recent myocardial infarction, heart block, heart disease, hypertension, hyperthyroidism, mononucleosis, or myasthenia gravis.   No known history of amoxicillin-clavulanate-associated jaundice or hepatic impairment.   No known history of azithromycin-associated cholestatic jaundice or hepatic impairment.   No history of aspirin triad; NSAID-induced asthma/urticaria; coronary artery disease; coagulation disorder; urinary retention; electrolyte abnormalities; G6PD deficiency; GI bleeding; hypertension; influenza, varicella, or febrile viral infection; or CABG surgery.   Past medical history   Immune conditions: No immunocompromising conditions. No history of cancer.   Assessment   Eustachian tube dysfunction.   Plan   Medications:    ibuprofen 800 mg tablet RX 800mg 1 tab PO q8h 10d PRN for any fever, pain, or discomfort associated with your condition. Take with food to avoid upset stomach. Do not exceed 3200 mg (4 tablets) in a 24-hour period. Amount is 30 tab.    fluticasone 50 mcg/actuation nasal spray,suspension OTC 50mcg 2 sprays each nostril nasal qd 14d for ear discomfort or allergies. Fluticasone needs to be used every day to work. It may take  up to a week for the full effects of the medication to be seen. Amount is 16 g.   The patient's prescription will be sent to:   [facility] [phone] [facility] [address]   Phone: [phone]     Fax: [phone]   Patient informed to purchase OTC medication.   Education:    Condition and causes    Prevention    Treatment and self-care    When to call provider   ----------   Electronically signed by RAUDEL Hawkins on 2022-08-31 at 13:51PM   ----------   Patient Interview Transcript:   How would you describe your ear pain or discomfort? Select all that apply.    Achy    Itchy    Crackling or popping   Not selected:    Blocked or plugged    Full    Pressure    Throbbing    Shooting/stabbing/sharp   On a scale of 1 to 10, how severe is your ear pain? Select one.    Moderate (4 to 6); it's pretty uncomfortable   Not selected:    Mild (1 to 3); it bothers me a little bit    Moderate to severe (7 to 9); it's intense    Very severe; it's unbearable (10+)   Which ear is affected? Select one.    Both of my ears   Not selected:    My left ear    My right ear   When did you first notice this ear pain or discomfort? Select one.    2 to 3 days ago   Not selected:    Today    Yesterday    4 to 5 days ago    More than 5 days ago   Did your ear pain or discomfort come on suddenly or over time? Select one.    Over time   Not selected:    Suddenly    I'm not sure   Is the outside of the affected ear red, swollen, warm to the touch, or painful to the touch? Select all that apply.    None of these   Not selected:    Red    Swollen    Warm to the touch    Painful to the touch   Is your ear pain or discomfort always there, or does it come and go? Select one.    Always there   Not selected:    Comes and goes    I'm not sure   Does the pain or discomfort get worse when you bite or chew? Select one.    Yes   Not selected:    No   Along with your ear symptoms, have you had a fever or felt hot? Select one.     No   Not selected:    Yes   Do you have any of these on the same side as your affected ear?    None of the above   Not selected:    Pain or tenderness over the bone behind your ear    Redness over the bone behind your ear    Warmth over the bone behind your ear   Do your symptoms include the sudden onset of ringing in one or both ears? Select one.    Yes   Not selected:    No   Do your symptoms include hearing loss? Select one.    No   Not selected:    Yes, I can't hear as well as I usually do    Yes, I can't hear at all in either ear   Has there been fluid draining out of either ear? Select one.    No   Not selected:    Yes, but no more than usual    Yes, and this is new or more than the usual amount   Along with your ear symptoms, have you had any of these cold symptoms? Select all that apply.    Stuffy nose (nasal congestion)    Sinus pain or pressure    Cough    Scratchy or sore throat   Not selected:    Runny nose    Postnasal drip    None of the above   How long have you had these nasal or sinus symptoms? Select one.    3 to 5 days   Not selected:    Less than 48 hours    6 to 9 days    10 to 14 days    2 to 4 weeks    1 month or longer   Have your nasal or sinus symptoms improved at all since they began? Select one.    No   Not selected:    Yes, but they haven't gone away completely    Yes, but then they came back worse than before    I'm not sure   Along with your ear symptoms, do you have any of these throat or digestion symptoms? Select all that apply.    Difficulty swallowing   Not selected:    Burning feeling in your chest (heartburn)    Swallowed food or liquids getting stuck and coming back up    Feeling like there's a lump in your throat    Hoarse voice    None of these   Along with your ear symptoms, have you had a headache? Select one.    Yes, and the pain is mild to moderate   Not selected:    Yes, and the pain is severe    Yes, and the pain is unbearable    Yes, and it's the worst headache of my  life    No   Have you had any of these vision changes? Select all that apply.    None of these   Not selected:    Blurry vision    Double vision    I can't see at all from one or both eyes   Along with your ear symptoms, have you felt dizzy or had vertigo? This includes feeling like you're spinning, swaying, or tilting; feeling light-headed; or feeling like the room is moving around you. Select one.    Yes   Not selected:    No   Has your dizziness or vertigo been so severe that you can't walk without assistance? Select one.    No   Not selected:    Yes   Do your symptoms include vomiting? Select one.    No   Not selected:    Yes   Are you able to open your mouth as wide as you normally can? Select one.    No, it's painful   Not selected:    Yes    No, my jaw seems to get stuck before I open it all the way    No, it's painful, and my jaw seems to get stuck before I open it all the way   Do you have any difficulty closing your mouth? This includes feeling pain when you try to close your mouth, or feeling as if your jaw is locked or stuck open. Select one.    No   Not selected:    Yes   When you open or close your mouth, do you notice any clicking, creaking, or popping in the jaw area? Select one.    No   Not selected:    Yes   On the side where you're having pain, gently press on your jaw joint and the surrounding areas of your face. Does this make the pain or tenderness worse?    No   Not selected:    Yes   Can you move your chin toward your chest? Select one.    Yes   Not selected:    No, my neck is too stiff   Does your ear pain or discomfort get worse if you do either of these: 1. Pull the cartilage part of your ear up and back 2. Push on your tragus (the flesh in front of your ear opening)    Yes   Not selected:    No   Take a moment and lie down. Does your ear pain or discomfort feel worse when you lie down? Select one.    No   Not selected:    Yes   Right before your symptoms started, did you put anything  sharp or pointed into your ear canal? Select one.    No   Not selected:    Yes   Is it possible that you have something stuck in your ear canal? Examples include a bead, button, rock, or part of an earbud. Select one.    No   Not selected:    Yes   Right before your ear pain began, did you have a severe head or ear injury? Examples include: - A blow to your head or ear - Hitting your head or ear on a hard surface - Falling on your ear while skateboarding or skiing - A motor vehicle accident - A sports injury, such as in wrestling - Penetrating trauma, such as a knife wound Select one.    No   Not selected:    Yes   Before your symptoms started, did any of these happen to you? Select all that apply.    None of the above   Not selected:    Jaw trauma, such as being hit or punched in the jaw or other blunt trauma    Jaw dislocation    Dental work    Dental infection or abscess    Increase in stress   Do you have any of these habits? Select all that apply.    None of the above   Not selected:    Lip chewing    Nail biting    Jaw or teeth clenching    Teeth grinding    Frequent gum chewing   In the week before your symptoms started, did any of these apply to you? Select all that apply.    None of the above   Not selected:    Air travel    Scuba diving    Hiking or driving in the mountains    Exposed to a loud blast or explosion   In the few weeks before your symptoms started, did you go swimming or get water in one or both ears? Select one.    No   Not selected:    Yes   Have you recently been exposed to more smoke or air pollution than usual? Select all that apply.    None of the above   Not selected:    Yes, tobacco smoke    Yes, smoke from a fire or wood-burning stove    Yes, air pollution   Do you regularly use any of these items? Select all that apply.    None of the above   Not selected:    Hearing aids    Earbuds or in-ear headphones    Swim caps    Cotton swabs to clean your ears    Earwax removal devices, such  as an irrigation kit   Are you being treated by an Ear, Nose and Throat (ENT) doctor for an ear condition? Select one.    No   Not selected:    Yes   Do you have ear tubes? Some other names for ear tubes are tympanostomy tubes, ventilation tubes, or pressure equalization (PE) tubes. Select one.    No   Not selected:    Yes, in my left ear only    Yes, in my right ear only    Yes, in both ears    No, but I've had them in the past   In the past 5 years, have you had mastoid surgery or ear surgery (not including ear tube placement or removal)? Select one.    No   Not selected:    Yes   When was the last time you were treated with antibiotics for an ear infection? This includes both oral antibiotics and antibiotic ear drops. Select one.    More than a year ago   Not selected:    Never    Within the last month    1 to 3 months ago    4 months to a year ago    I'm not sure   Have you ever been diagnosed with a temporomandibular joint disorder (TMJ or TMD)? Select one.    No, not that I know of   Not selected:    Yes   Have you ever been diagnosed with heartburn, GERD (gastroesophageal reflux), or LPR (laryngopharyngeal reflux)? Select one.    No   Not selected:    Yes   Do you have any of these conditions that can affect the immune system? Scroll to see all options. Select all that apply.    None of these   Not selected:    History of bone marrow transplant    Chronic kidney disease    Chronic liver disease (including cirrhosis)    HIV/AIDS    Inflammatory bowel disease (Crohn's disease or ulcerative colitis)    Lupus    Moderate to severe plaque psoriasis    Multiple sclerosis    Rheumatoid arthritis    Sickle cell anemia    Alpha or beta thalassemia    History of solid organ transplant (kidney, liver, or heart)    History of spleen removal    An autoimmune disorder not listed here    A condition requiring treatment with long-term use of oral steroids (such as prednisone, prednisolone, or dexamethasone)   Have you ever  been diagnosed with cancer? Select one.    No   Not selected:    Yes, I have cancer now    Yes, but I'm in remission   Are you pregnant? Select one.    No   Not selected:    Yes   When was your last menstrual period? If you don't currently have periods or no longer have periods, please briefly explain.    Partial hysterectomy 2014   Are you breastfeeding? Select one.    No   Not selected:    Yes   The next few questions help us recommend the best treatment for you. Have you used any of these non-prescription medications for your ear symptoms? Select all that apply.    Acetaminophen (Tylenol)    Cetirizine (Zyrtec)   Not selected:    Carbamide peroxide otic (Auro, Debrox)    Diphenhydramine (Benadryl)    Fexofenadine (Allegra)    Fluticasone (Flonase)    Ibuprofen (Advil, Motrin, Midol)    Naproxen (Aleve)    Isopropyl alcohol in glycerin ear drops (Swim Ear drops)    Loratadine (Alavert, Claritin)    Oxymetazoline (Afrin)    Phenylephrine (Sudafed)    Triamcinolone (Nasacort)    None of these   Are you taking any other medications, vitamins, or supplements? Select one.    No   Not selected:    Yes   Have you ever had an allergic or bad reaction to any medication? Select one.    No   Not selected:    Yes   Are you currently being treated for any of these conditions? Select all that apply.    None of the above   Not selected:    Arrhythmia    Congestive heart failure    Recent heart attack    Heart block    Blockage or narrowing of the blood vessels of the heart    Hypertension    Hyperthyroidism    Mononucleosis    Myasthenia gravis   Amoxicillin-clavulanate (Augmentin)    No   Not selected:    Jaundice    Liver problems   Azithromycin (Zithromax, Zmax)    No   Not selected:    Jaundice    Liver problems   Have you had any of these conditions? Select all that apply.    None of the above   Not selected:    Aspirin triad (also known as Samter's triad or ASA triad)    Asthma or hives from taking aspirin or other NSAIDs,  such as ibuprofen or naproxen    Coagulation disorder    Difficulty urinating or completely emptying your bladder    Electrolyte abnormalities    G6PD deficiency    Gastrointestinal (GI) bleeding    Influenza, chickenpox, or a viral infection with fever    Recent coronary artery bypass graft (CABG) surgery   Have you taken any monoamine oxidase inhibitor (MAOI) medications within the last 14 days? Examples include rasagiline (Azilect), selegiline (Eldepryl, Zelapar), isocarboxazid (Marplan), phenelzine (Nardil), and tranylcypromine (Parnate). Select one.    No   Not selected:    Yes   Do you need a doctor's note? A doctor's note confirms that you received care today and states when you can return to school or work. It does not contain information about your diagnosis or treatment plan. Your provider will make the final decision on whether to give you a doctor's note. Doctor's notes CANNOT be backdated. Select one.    No   Not selected:    Today only (1 day)    Today and tomorrow (2 days)    3 days   Is there anything else you'd like to tell us about your symptoms?   The patient did not enter any additional information.   ----------   Medical history   Medical history data does not currently exist for this patient.

## 2022-09-01 RX ORDER — OFLOXACIN 3 MG/ML
5 SOLUTION AURICULAR (OTIC) 2 TIMES DAILY
Qty: 5 ML | Refills: 0 | Status: SHIPPED | OUTPATIENT
Start: 2022-09-01 | End: 2022-09-08

## 2022-09-01 RX ORDER — AMOXICILLIN AND CLAVULANATE POTASSIUM 875; 125 MG/1; MG/1
1 TABLET, FILM COATED ORAL 2 TIMES DAILY
Qty: 20 TABLET | Refills: 0 | Status: SHIPPED | OUTPATIENT
Start: 2022-09-01 | End: 2022-09-11

## 2022-11-15 ENCOUNTER — TELEPHONE (OUTPATIENT)
Dept: INTERNAL MEDICINE | Facility: CLINIC | Age: 37
End: 2022-11-15

## 2022-11-15 NOTE — TELEPHONE ENCOUNTER
Caller: Kenya Easton    Relationship: Self    Best call back number: [phone]    Requested Prescriptions:   TAMIFLU    Pharmacy where request should be sent:  [facility] [id number] [address] [phone]  - [phone]       Additional details provided by patient: KIDS TESTED POSITIVE FOR FLU A, BODY ACHE, PAIN, CONGESTION    Does the patient have less than a 3 day supply:  [x] Yes  [] No    Adrian Massey Rep   11/15/22 15:33 EST

## 2022-11-16 RX ORDER — OSELTAMIVIR PHOSPHATE 75 MG/1
75 CAPSULE ORAL DAILY
Qty: 10 CAPSULE | Refills: 0 | Status: SHIPPED | OUTPATIENT
Start: 2022-11-16 | End: 2022-11-26

## 2022-11-16 NOTE — TELEPHONE ENCOUNTER
I will call this in but let her know that tamiflu has significant side effects including nightmares and hallucinations. If she has any side effects stop it immediately.

## 2023-03-06 ENCOUNTER — OFFICE VISIT (OUTPATIENT)
Dept: INTERNAL MEDICINE | Facility: CLINIC | Age: 38
End: 2023-03-06
Payer: COMMERCIAL

## 2023-03-06 VITALS
HEART RATE: 75 BPM | OXYGEN SATURATION: 98 % | DIASTOLIC BLOOD PRESSURE: 88 MMHG | WEIGHT: 249 LBS | TEMPERATURE: 97.8 F | BODY MASS INDEX: 44.12 KG/M2 | HEIGHT: 63 IN | RESPIRATION RATE: 16 BRPM | SYSTOLIC BLOOD PRESSURE: 132 MMHG

## 2023-03-06 DIAGNOSIS — M25.561 ACUTE PAIN OF RIGHT KNEE: Primary | ICD-10-CM

## 2023-03-06 DIAGNOSIS — R20.2 NUMBNESS AND TINGLING IN RIGHT HAND: ICD-10-CM

## 2023-03-06 DIAGNOSIS — J30.89 ENVIRONMENTAL AND SEASONAL ALLERGIES: ICD-10-CM

## 2023-03-06 DIAGNOSIS — G43.009 MIGRAINE WITHOUT AURA AND WITHOUT STATUS MIGRAINOSUS, NOT INTRACTABLE: ICD-10-CM

## 2023-03-06 DIAGNOSIS — E66.01 CLASS 3 SEVERE OBESITY WITH SERIOUS COMORBIDITY AND BODY MASS INDEX (BMI) OF 40.0 TO 44.9 IN ADULT, UNSPECIFIED OBESITY TYPE: ICD-10-CM

## 2023-03-06 DIAGNOSIS — M79.641 RIGHT HAND PAIN: ICD-10-CM

## 2023-03-06 DIAGNOSIS — R20.0 NUMBNESS AND TINGLING IN RIGHT HAND: ICD-10-CM

## 2023-03-06 DIAGNOSIS — K21.9 GASTROESOPHAGEAL REFLUX DISEASE WITHOUT ESOPHAGITIS: ICD-10-CM

## 2023-03-06 RX ORDER — PREDNISONE 1 MG/1
TABLET ORAL
Qty: 21 TABLET | Refills: 0 | Status: SHIPPED | OUTPATIENT
Start: 2023-03-06

## 2023-03-06 RX ORDER — ERGOCALCIFEROL 1.25 MG/1
50000 CAPSULE ORAL WEEKLY
Qty: 4 CAPSULE | Refills: 5 | Status: SHIPPED | OUTPATIENT
Start: 2023-03-06

## 2023-03-06 RX ORDER — OMEPRAZOLE 40 MG/1
40 CAPSULE, DELAYED RELEASE ORAL DAILY
Qty: 30 CAPSULE | Refills: 11 | COMMUNITY
Start: 2023-01-03 | End: 2023-03-06 | Stop reason: SDUPTHER

## 2023-03-06 RX ORDER — OMEPRAZOLE 40 MG/1
40 CAPSULE, DELAYED RELEASE ORAL DAILY
Qty: 30 CAPSULE | Refills: 5 | Status: SHIPPED | OUTPATIENT
Start: 2023-03-06 | End: 2024-03-06

## 2023-03-06 RX ORDER — FLUTICASONE PROPIONATE 50 MCG
2 SPRAY, SUSPENSION (ML) NASAL DAILY
Qty: 15.8 ML | Refills: 5 | Status: SHIPPED | OUTPATIENT
Start: 2023-03-06

## 2023-03-06 RX ORDER — LORATADINE 10 MG/1
10 TABLET ORAL DAILY
Qty: 30 TABLET | Refills: 5 | Status: SHIPPED | OUTPATIENT
Start: 2023-03-06

## 2023-03-06 RX ORDER — MELOXICAM 15 MG/1
TABLET ORAL
Qty: 30 TABLET | Refills: 5 | Status: SHIPPED | OUTPATIENT
Start: 2023-03-06

## 2023-03-06 RX ORDER — TOPIRAMATE 25 MG/1
25 TABLET ORAL 2 TIMES DAILY
Qty: 60 TABLET | Refills: 5 | Status: SHIPPED | OUTPATIENT
Start: 2023-03-06

## 2023-03-06 RX ORDER — PHENTERMINE HYDROCHLORIDE 37.5 MG/1
37.5 TABLET ORAL
Qty: 30 TABLET | Refills: 0 | Status: SHIPPED | OUTPATIENT
Start: 2023-03-06

## 2023-03-06 NOTE — PROGRESS NOTES
Subjective   Chief Complaint   Patient presents with   • Obesity             Kenya Novoa is a 37 y.o. female.     The patient is here today for follow up on obesity.    Acute pain of right knee  The patient reports experiencing pain and swelling in her right knee and she is unsure of the cause of this issue. She denies injury. She explains that she just started feeling the pain and thought that it would resolve quickly. On 03/05/2023, she woke up  and she was having difficulty ambulating due to pain. When she tried to bend it, she is able to feel something pulling. This has been ongoing for approximately 4 to 5 days. Her symptoms have been increasing daily. She has tried Advil and Motrin with no improvement in her symptoms. A friend gave her a pain pill and she was finally able to get some sleep with that. She has been prescribed meloxicam in the past for her wrist pain. She has been propping her right leg up to try to mitigate pain and swelling with some mild improvement in her symptoms.      Right hand pain with numbness and tingling  The patient reports that she is interested in being rescheduled for the EMG study to be performed on her right wrist to see if her symptoms are from carpel tunnel. She describes her pain as a burning sensation. She expresses that she is also experiencing numbness and tingling in her right hand. She notes that her pain has even woken her up in the middle of the night. She has been prescribed a steroid pack in the past for this issue.     Obesity  The patient would like to restart the Adipex-P. She had positive results with this medication in the past. She discontinued the medication in order to give her body a break from it. She was prescribed Topamax for headaches and for weight loss and she had improvement in her symptoms while taking that medication as well.     Allergies  The patient states she needs a refill on Claritin and Flonase.    GERD  The patient states she is  not taking omeprazole currently and would like to restart this medication due to heartburn symptoms.       I have reviewed the following portions of the patient's history and confirmed they are accurate: allergies, current medications, past family history, past medical history, past social history, past surgical history, and problem list    I have personally completed the patient's review of systems.    Review of Systems   Constitutional: Negative for activity change, appetite change, chills, diaphoresis, fatigue, fever, unexpected weight gain and unexpected weight loss.   HENT: Negative for congestion, ear discharge, ear pain, mouth sores, nosebleeds, postnasal drip, rhinorrhea, sinus pressure, sneezing and sore throat.    Eyes: Negative for pain, discharge and itching.   Respiratory: Negative for cough, chest tightness, shortness of breath and wheezing.    Cardiovascular: Negative for chest pain, palpitations and leg swelling.   Gastrointestinal: Positive for GERD and indigestion. Negative for abdominal pain, constipation, diarrhea, nausea and vomiting.   Endocrine: Negative for heat intolerance, polydipsia and polyphagia.   Genitourinary: Negative for dysuria, flank pain, frequency, hematuria and urgency.   Musculoskeletal: Positive for arthralgias, back pain, myalgias, neck pain and neck stiffness. Negative for gait problem and joint swelling.   Skin: Negative for color change, pallor and rash.   Allergic/Immunologic: Positive for environmental allergies. Negative for immunocompromised state.   Neurological: Positive for numbness. Negative for seizures, speech difficulty and weakness.   Hematological: Negative for adenopathy.   Psychiatric/Behavioral: Negative for agitation, decreased concentration, sleep disturbance, suicidal ideas and depressed mood. The patient is not nervous/anxious.        Current Outpatient Medications on File Prior to Visit   Medication Sig   • multivitamin (Multiple Vitamin) tablet  "tablet Take 1 tablet by mouth Daily.     No current facility-administered medications on file prior to visit.       Objective   Vitals:    03/06/23 1511   BP: 132/88   Pulse: 75   Resp: 16   Temp: 97.8 °F (36.6 °C)   TempSrc: Tympanic   SpO2: 98%   Weight: 113 kg (249 lb)   Height: 160 cm (63\")     Body mass index is 44.11 kg/m².    Physical Exam  Vitals reviewed.   Constitutional:       Appearance: Normal appearance. She is well-developed.   HENT:      Head: Normocephalic and atraumatic.      Nose: Nose normal.   Eyes:      General: Lids are normal.      Conjunctiva/sclera: Conjunctivae normal.      Pupils: Pupils are equal, round, and reactive to light.   Neck:      Thyroid: No thyromegaly.      Trachea: Trachea normal.   Cardiovascular:      Rate and Rhythm: Normal rate and regular rhythm.      Heart sounds: Normal heart sounds.   Pulmonary:      Effort: Pulmonary effort is normal. No respiratory distress.      Breath sounds: Normal breath sounds.   Musculoskeletal:      Right knee: Tenderness present.   Skin:     General: Skin is warm and dry.   Neurological:      Mental Status: She is alert and oriented to person, place, and time.      GCS: GCS eye subscore is 4. GCS verbal subscore is 5. GCS motor subscore is 6.   Psychiatric:         Attention and Perception: Attention normal.         Mood and Affect: Mood normal.         Speech: Speech normal.         Behavior: Behavior normal. Behavior is cooperative.         Thought Content: Thought content normal.         Assessment & Plan   Problem List Items Addressed This Visit        Allergies and Adverse Reactions    Environmental and seasonal allergies    Relevant Medications    loratadine (CLARITIN) 10 MG tablet    fluticasone (Flonase) 50 MCG/ACT nasal spray       Endocrine and Metabolic    Class 3 severe obesity with body mass index (BMI) of 40.0 to 44.9 in adult (HCC)    Relevant Medications    phentermine (ADIPEX-P) 37.5 MG tablet    topiramate (Topamax) 25 MG " tablet   Other Visit Diagnoses     Acute pain of right knee    -  Primary    Relevant Medications    meloxicam (MOBIC) 15 MG tablet    predniSONE (DELTASONE) 5 MG tablet    Other Relevant Orders    XR Knee 1 or 2 View Right    Right hand pain        Relevant Orders    EMG & Nerve Conduction Test    Migraine without aura and without status migrainosus, not intractable        Relevant Medications    meloxicam (MOBIC) 15 MG tablet    topiramate (Topamax) 25 MG tablet    Gastroesophageal reflux disease without esophagitis        Relevant Medications    omeprazole (priLOSEC) 40 MG capsule    Numbness and tingling in right hand        Relevant Orders    EMG & Nerve Conduction Test        Plan:    1. Acute pain of right knee  - New, unstable.   - Start meloxicam.   - Start prednisone pack.   - Patient will elevate, ice, and rest her right knee.   - She can use supportive wrap as needed.   - If no improvement in about a week, she will go and get x-ray completed.    2. Right hand pain with numbness and tingling  - Chronic, unstable.   - EMG ordered.    3. Obesity  - Chronic, unstable.  - Start adipex  - Patient was educated on side effects of taking Adipex medications including risk of addiction, increased heart rate, blood pressure, anxiety, insomnia, and dry mouth. Patient verbalized understanding and wants to continue with this medication. Patient will stop medication and schedule earlier follow up if these symptoms occur. Patient will increase water intake, follow a well balanced diet low in cholesterol/carbs/sugars, decrease portion sizes, and increase physical activity.     4. Environmental seasonal allergies  - Chronic, unstable.   - Start Claritin and Flonase.    5. GERD  - Chronic, unstable.   - Start omeprazole.    6. Migraines  - Chronic, unstable.   - Start Topamax.         Current Outpatient Medications:   •  fluticasone (Flonase) 50 MCG/ACT nasal spray, 2 sprays into the nostril(s) as directed by provider  Daily., Disp: 15.8 mL, Rfl: 5  •  loratadine (CLARITIN) 10 MG tablet, Take 1 tablet by mouth Daily., Disp: 30 tablet, Rfl: 5  •  omeprazole (priLOSEC) 40 MG capsule, Take 1 capsule by mouth Daily., Disp: 30 capsule, Rfl: 5  •  phentermine (ADIPEX-P) 37.5 MG tablet, Take 1 tablet by mouth Every Morning Before Breakfast., Disp: 30 tablet, Rfl: 0  •  topiramate (Topamax) 25 MG tablet, Take 1 tablet by mouth 2 (Two) Times a Day., Disp: 60 tablet, Rfl: 5  •  vitamin D (ERGOCALCIFEROL) 1.25 MG (49358 UT) capsule capsule, Take 1 capsule by mouth 1 (One) Time Per Week., Disp: 4 capsule, Rfl: 5  •  meloxicam (MOBIC) 15 MG tablet, Take 1/2 to 1 tablet by mouth once daily as needed for moderate pain. Take with food, Disp: 30 tablet, Rfl: 5  •  multivitamin (Multiple Vitamin) tablet tablet, Take 1 tablet by mouth Daily., Disp: 30 tablet, Rfl: 11  •  predniSONE (DELTASONE) 5 MG tablet, Take as directed on package instruction - 6 day taper., Disp: 21 tablet, Rfl: 0       Plan of care reviewed with the patient at the conclusion of today's visit.  Education was provided regarding diagnosis, management, and any prescribed or recommended OTC medications.  Patient verbalized understanding of and agreement with management plan.     Return in 1 month (on 4/6/2023), or if symptoms worsen or fail to improve, for Follow-up.    Transcribed from ambient dictation for RAUDEL Henderson by Luz Ventura.  03/06/23   18:45 EST    Patient or patient representative verbalized consent to the visit recording.  I have personally performed the services described in this document as transcribed by the above individual, and it is both accurate and complete.

## 2024-07-09 ENCOUNTER — OFFICE VISIT (OUTPATIENT)
Dept: INTERNAL MEDICINE | Facility: CLINIC | Age: 39
End: 2024-07-09
Payer: COMMERCIAL

## 2024-07-09 VITALS
OXYGEN SATURATION: 95 % | WEIGHT: 258 LBS | BODY MASS INDEX: 45.71 KG/M2 | DIASTOLIC BLOOD PRESSURE: 72 MMHG | HEIGHT: 63 IN | SYSTOLIC BLOOD PRESSURE: 138 MMHG | HEART RATE: 76 BPM

## 2024-07-09 DIAGNOSIS — M25.572 ACUTE LEFT ANKLE PAIN: Primary | ICD-10-CM

## 2024-07-09 DIAGNOSIS — R03.0 ELEVATED BLOOD PRESSURE READING: ICD-10-CM

## 2024-07-09 DIAGNOSIS — E66.01 CLASS 3 SEVERE OBESITY WITH SERIOUS COMORBIDITY AND BODY MASS INDEX (BMI) OF 45.0 TO 49.9 IN ADULT, UNSPECIFIED OBESITY TYPE: ICD-10-CM

## 2024-07-09 PROCEDURE — 1125F AMNT PAIN NOTED PAIN PRSNT: CPT | Performed by: NURSE PRACTITIONER

## 2024-07-09 PROCEDURE — 99214 OFFICE O/P EST MOD 30 MIN: CPT | Performed by: NURSE PRACTITIONER

## 2024-07-09 RX ORDER — PREDNISONE 10 MG/1
TABLET ORAL
Qty: 21 TABLET | Refills: 0 | Status: SHIPPED | OUTPATIENT
Start: 2024-07-09

## 2024-07-09 RX ORDER — IBUPROFEN 600 MG/1
600 TABLET ORAL EVERY 6 HOURS PRN
Qty: 90 TABLET | Refills: 2 | Status: SHIPPED | OUTPATIENT
Start: 2024-07-09

## 2024-07-09 RX ORDER — TOPIRAMATE 25 MG/1
25 TABLET ORAL 2 TIMES DAILY
Qty: 60 TABLET | Refills: 1 | Status: SHIPPED | OUTPATIENT
Start: 2024-07-09

## 2024-07-09 RX ORDER — HYDROCODONE BITARTRATE AND ACETAMINOPHEN 7.5; 325 MG/1; MG/1
1 TABLET ORAL EVERY 6 HOURS PRN
Qty: 12 TABLET | Refills: 0 | Status: SHIPPED | OUTPATIENT
Start: 2024-07-09 | End: 2024-07-12 | Stop reason: SDUPTHER

## 2024-07-09 NOTE — PROGRESS NOTES
Subjective   Chief Complaint   Patient presents with    Leg Swelling     Left ankle        Kenya Novoa is a 38 y.o. female.    History of Present Illness  The patient presents for evaluation of multiple medical concerns.    The patient has been experiencing persistent pain in her left ankle for the past 3 weeks, with no recollection of any precipitating injury. Despite undergoing x-rays of the foot and ankle at [facility], no fractures were detected. The onset of pain was sudden, characterized by a popping, bruise, and swelling on the lateral aspect of her ankle, which has progressively worsened. The outside ankle is not painful, but rather on the inside. The previous day, she observed a bone protruding. Despite attempts at self-treatment with Ace bandages, ice packs, and elevation, the pain persists. The pain intensifies upon standing and walking, necessitating her to sit at her job. Currently, she describes the pain as throbbing in nature. She has previously tolerated steroids well and has tried diclofenac, meloxicam, Celebrex, and tramadol for her hand, but these have not provided relief. Hydrocodone provides relief, particularly at night, when she is not on it.    The patient expresses a desire to initiate Adipex and Topamax. She has resumed consumption of sodas and has experienced weight gain. BP was elevated on arrival today.     I have reviewed the following portions of the patient's history and confirmed they are accurate: allergies, current medications, past family history, past medical history, past social history, past surgical history, and problem list    Review of Systems  Pertinent items are noted in HPI.     No current outpatient medications on file prior to visit.     No current facility-administered medications on file prior to visit.       Objective   Vitals:    07/09/24 1606 07/09/24 1640   BP: (!) 170/102 138/72   BP Location: Left arm    Pulse: 76    SpO2: 95%    Weight: 117  "kg (258 lb)    Height: 160 cm (63\")      Body mass index is 45.7 kg/m².    Physical Exam  Vitals reviewed.   Constitutional:       Appearance: Normal appearance. She is well-developed.   HENT:      Head: Normocephalic and atraumatic.      Nose: Nose normal.   Eyes:      General: Lids are normal.      Conjunctiva/sclera: Conjunctivae normal.      Pupils: Pupils are equal, round, and reactive to light.   Neck:      Thyroid: No thyromegaly.      Trachea: Trachea normal.   Cardiovascular:      Rate and Rhythm: Normal rate and regular rhythm.      Heart sounds: Normal heart sounds.   Pulmonary:      Effort: Pulmonary effort is normal. No respiratory distress.      Breath sounds: Normal breath sounds.   Musculoskeletal:      Left ankle: Swelling present. Tenderness present. Decreased range of motion.   Skin:     General: Skin is warm and dry.   Neurological:      Mental Status: She is alert and oriented to person, place, and time.      GCS: GCS eye subscore is 4. GCS verbal subscore is 5. GCS motor subscore is 6.   Psychiatric:         Attention and Perception: Attention normal.         Mood and Affect: Mood normal.         Speech: Speech normal.         Behavior: Behavior normal. Behavior is cooperative.         Thought Content: Thought content normal.         Results  Imaging  X-ray of the foot and ankle showed no fracture.    Assessment & Plan   Problem List Items Addressed This Visit    None  Visit Diagnoses       Acute left ankle pain    -  Primary    Relevant Medications    predniSONE (DELTASONE) 10 MG (21) dose pack    ibuprofen (ADVIL,MOTRIN) 600 MG tablet    Diclofenac Sodium (VOLTAREN) 1 % gel gel    Class 3 severe obesity with serious comorbidity and body mass index (BMI) of 45.0 to 49.9 in adult, unspecified obesity type        Relevant Medications    topiramate (Topamax) 25 MG tablet    Elevated blood pressure reading                   Current Outpatient Medications:     Diclofenac Sodium (VOLTAREN) 1 % gel " gel, Apply 1 gram topically to indicated area 4 times daily as needed for mild to moderate pain., Disp: 100 g, Rfl: 2    HYDROcodone-acetaminophen (NORCO) 7.5-325 MG per tablet, Take 1 tablet by mouth Every 12 (Twelve) Hours As Needed for Moderate Pain or Severe Pain. Do not drive on meds, Disp: 12 tablet, Rfl: 0    ibuprofen (ADVIL,MOTRIN) 600 MG tablet, Take 1 tablet by mouth Every 6 (Six) Hours As Needed for Mild Pain or Moderate Pain (Take with food)., Disp: 90 tablet, Rfl: 2    predniSONE (DELTASONE) 10 MG (21) dose pack, Use as directed on package, Disp: 21 tablet, Rfl: 0    topiramate (Topamax) 25 MG tablet, Take 1 tablet by mouth 2 (Two) Times a Day., Disp: 60 tablet, Rfl: 1    Assessment & Plan  1. Acute left ankle pain.  This is a new, unstable condition. Will request [facility] send x-ray results. The patient will commence a regimen of ibuprofen and Tylenol as needed, along with a prednisone pack. Additionally, 12 tablets of hydrocodone were prescribed for use sparingly as needed for severe pain, and Voltaren gel will be initiated. If she does not respond to medications consider repeat xray and referral to ortho. Follow up in 2 weeks.     2. Class III severe obesity.  This is a chronic, unstable condition. The patient will commence Topamax. She is advised to follow a low-calorie diet and increase physical activity as tolerated. A follow-up appointment is scheduled for 2 weeks from now to recheck her blood pressure and determine if she is able to commence Adipex. She will also complete yearly screening blood work.    3. Elevated blood pressure.  This is a new, unstable condition. The patient's elevated blood pressure may be associated with her pain. She will adhere to a low-cholesterol, low-fat, high-fiber diet. She will return for yearly screening labs.    Follow-up  A follow-up appointment is scheduled for 2 weeks from now for a recheck.         Plan of care reviewed with the patient at the  conclusion of today's visit.  Education was provided regarding diagnosis, management, and any prescribed or recommended OTC medications.  Patient verbalized understanding of and agreement with management plan.     Return in about 2 weeks (around 7/23/2024), or if symptoms worsen or fail to improve.      Transcribed from ambient dictation for RAUDEL Henderson by RAUDEL Henderson.  07/09/24   16:36 EDT    Patient or patient representative verbalized consent for the use of Ambient Listening during the visit with  RAUDEL Henderson for chart documentation. 7/28/2024  01:46 EDT

## 2024-07-12 DIAGNOSIS — M25.572 ACUTE LEFT ANKLE PAIN: ICD-10-CM

## 2024-07-12 RX ORDER — HYDROCODONE BITARTRATE AND ACETAMINOPHEN 7.5; 325 MG/1; MG/1
1 TABLET ORAL EVERY 12 HOURS PRN
Qty: 12 TABLET | Refills: 0 | Status: SHIPPED | OUTPATIENT
Start: 2024-07-12

## 2024-07-12 NOTE — TELEPHONE ENCOUNTER
Caller: Kenya Easton    Relationship: Self    Best call back number: [phone]     Requested Prescriptions:   Requested Prescriptions     Pending Prescriptions Disp Refills    HYDROcodone-acetaminophen (NORCO) 7.5-325 MG per tablet 12 tablet 0     Sig: Take 1 tablet by mouth Every 6 (Six) Hours As Needed for Moderate Pain or Severe Pain.        Pharmacy where request should be sent: [facility] [id number] [phone] [facility] [phone]      Last office visit with prescribing clinician: 7/9/2024   Last telemedicine visit with prescribing clinician: Visit date not found   Next office visit with prescribing clinician: 7/22/2024     Additional details provided by patient: PATIENT IS OUT OF THE MEDICATION     Does the patient have less than a 3 day supply:  [x] Yes  [] No    Would you like a call back once the refill request has been completed: [x] Yes [] No    If the office needs to give you a call back, can they leave a voicemail: [x] Yes [] No    Adrian Anna Rep   07/12/24 13:38 EDT